# Patient Record
Sex: FEMALE | URBAN - METROPOLITAN AREA
[De-identification: names, ages, dates, MRNs, and addresses within clinical notes are randomized per-mention and may not be internally consistent; named-entity substitution may affect disease eponyms.]

---

## 2022-09-28 ENCOUNTER — TELEPHONE (OUTPATIENT)
Dept: OBGYN CLINIC | Facility: CLINIC | Age: 37
End: 2022-09-28

## 2024-06-18 ENCOUNTER — ORDER TRANSCRIPTION (OUTPATIENT)
Dept: PHYSICAL THERAPY | Facility: HOSPITAL | Age: 39
End: 2024-06-18

## 2024-06-18 DIAGNOSIS — R42 VERTIGO: Primary | ICD-10-CM

## 2024-06-18 DIAGNOSIS — H81.10 BENIGN PAROXYSMAL POSITIONAL VERTIGO: ICD-10-CM

## 2024-07-24 ENCOUNTER — TELEPHONE (OUTPATIENT)
Dept: PHYSICAL THERAPY | Facility: HOSPITAL | Age: 39
End: 2024-07-24

## 2024-07-25 ENCOUNTER — APPOINTMENT (OUTPATIENT)
Dept: PHYSICAL THERAPY | Facility: HOSPITAL | Age: 39
End: 2024-07-25
Attending: Other
Payer: COMMERCIAL

## 2024-07-25 ENCOUNTER — TELEPHONE (OUTPATIENT)
Dept: PHYSICAL THERAPY | Facility: HOSPITAL | Age: 39
End: 2024-07-25

## 2024-07-25 NOTE — PROGRESS NOTES
VESTIBULAR EVALUATION:     Diagnosis:   Vertigo (R42)  Benign paroxysmal positional vertigo       Referring Provider: Carolina Martinez  Date of Evaluation:    2024    Precautions:  {PT_PRECAUTIONS:564::\"None\"} Next MD visit:   none scheduled  Date of Surgery: n/a     PATIENT SUMMARY   Karoline Pickard is a 38 year old female who presents to therapy today with reports of ***  History/onset of current condition: ***    Symptoms with cough/sneeze or loud noise: {Yes/No:5584}  Falls: {Yes/No:5584}  Hx of migraines: {Yes/No:5584}  Hx of vision issue: {Yes/No:5584}  Hx of hearing issues: {EDW VESTIBULAR EVAL HEARING COMPLAINTS:8665}    Dizziness: Current ***/10, Best ***/10, Worst ***/10  Quality: ***  Frequency/Duration:  ***  Aggravates: {ELM Dizzy Aggravatin:::0}  Relieves: {ELM Dizzy Relievin:::0}    Headache: Current ***/10, Best ***/10, Worst ***/10  Quality:***  Frequency/Duration:  ***  Aggravates: {ELM Headache Aggravatin:::0}   Relieves: {ELM Headache Relievin:::0}     Cervical pain: Current ***/10, Best ***/10, Worst ***/10  Aggravates: {ELM Cervical Aggravatin:::0}   Relieves: {ELM Cervical Relievin:::0}     Dizziness Handicap Inventory (DHI): ***     Current functional limitations include ***.   Social history:  ***  Karoline describes prior level of function ***  Pt goals include ***  Past medical history was reviewed with Karoline. Significant findings include:  No past medical history on file.      ASSESSMENT  Karoline presents to physical therapy evaluation with primary c/o ***. The results of the objective tests and measures show ***.  Functional deficits include but are not limited to ***.  Signs and symptoms are consistent with diagnosis of ***. Pt and PT discussed evaluation findings, pathology, POC and HEP.  Pt voiced understanding and performs HEP correctly. Skilled Physical Therapy is medically necessary to address the above impairments and reach  functional goals.    OBJECTIVE:   Physical Exam:  Posture/Observation: ***   Neuro Screen: Sensation: ***     Coordination Testing:   Finger to Nose: {[facility] NEURO:3170:::0}   Pronation/supination: {[facility] NEURO:3170:::0}   Toe tapping: {[facility] NEURO:3170:::0}     Cervical spine ROM: Flex***, Ext ***, R SB ***, L SB *** , R ROT ***, L ROT ***   Adverse neuro signs with ROM: {yes no:908122:::1}     Occulomotor & Vestibulo-Ocular Exam:  Spontaneous Nystagmus: room light: *** ;  fixation blocked: ***  Smooth Pursuit: {NEG/NOT TEST:8413}  Saccades: {NEG/NOT TEST:8413}  Gaze Evoked Nystagmus:  room light: {NEG/NOT TEST:8413}; fixation blocked: {NEG/NOT TEST:8413}  Head Thrust: {NEG/NOT TEST:8413}  VOR screen:  ***    VOR Cancellation: {NEG/NOT TEST:8413}   Convergence: {NEG/NOT TEST:8413}  Cover/Uncover:  {NEG/NOT TEST:8413}  Cross Cover:  {NEG/NOT TEST:8413}  Head Shaking Nystagmus: {NEG/NOT TEST:8413}  Dynamic Visual Acuity:  {NEG/NOT TEST:8413}    Positional Testing:   [place]: R ***, L ***   Roll Test (HC): ***     Postural Control:   Romberg: EO *** sec, EC *** sec   Romberg on Foam: EO *** sec, EC *** sec   Tandem Stance: R back EO *** sec, EC *** sec; L back EO *** sec, EC *** sec   SLS: R EO *** sec, EC *** sec; L EO *** sec, EC *** sec     Functional Mobility:   Gait: pt ambulates on level ground with {pt/ot/slp gait:8355:::0}.   Functional Gait Assessment (FGA): ***   Timed Up and Go: ***   Five Times Sit to Stand Test: ***   Stairs:***    Today's Treatment and Response:   Pt education was provided on exam findings, treatment diagnosis, treatment plan, expectations, and prognosis. Pt was also provided recommendations for {pt/ot/slp education:8354:::0}.   Patient was instructed in and issued a HEP for: ***    Charges: PT Eval {LOW/MODERATE/HIGH COMPLEXITY:7155:::0}, ***      Total Timed Treatment: *** min     Total Treatment Time: *** min     Based on clinical rationale and outcome measures, this  evaluation involved {LOW/MODERATE/HIGH COMPLEXITY:7155:::0} decision making due to {1-2, 3+:7227:::0} personal factors/comorbidities, {3, 4+:7228:::0} body structures involved/activity limitations, and {Evolving/Unstable:7229:::0} symptoms including {Vital sign response/changing pain levels:7230:::0}.  PLAN OF CARE:    Goals: (to be met in *** visits)      Frequency / Duration: Patient will be seen for *** x/week or a total of *** visits over a 90 day period. Treatment will include: {[facility] Phys Ther Home Treatment:7126:::0}.     Education or treatment limitation: {TX_LIMIT:1927:::0}   Rehab Potential: {GOOD:115:::0}     Patient/Family/Caregiver was advised of these findings, precautions, and treatment options and has agreed to actively participate in planning and for this course of care.     Thank you for your referral. Please co-sign or sign and return this letter via fax as soon as possible to [phone]. If you have any questions, please contact me at Dept: [phone]    Sincerely,  Electronically signed by therapist: Angi Driscoll PT  Physician's certification required: Yes  I certify the need for these services furnished under this plan of treatment and while under my care.    X___________________________________________________ Date____________________    Certification From: 7/25/2024  To:10/23/2024

## 2024-07-25 NOTE — TELEPHONE ENCOUNTER
Called to f/u with patient after NC/NS to PT this date. LVM reminding of next appt and offering an opening for later same day if still available. Requested she call back to let therapist know if she plans on keeping her remaining PT appointments. Phone numbers for therapist and scheduling line given.

## 2024-08-01 ENCOUNTER — TELEPHONE (OUTPATIENT)
Dept: PHYSICAL THERAPY | Facility: HOSPITAL | Age: 39
End: 2024-08-01

## 2024-08-01 ENCOUNTER — APPOINTMENT (OUTPATIENT)
Dept: PHYSICAL THERAPY | Facility: HOSPITAL | Age: 39
End: 2024-08-01
Attending: Other
Payer: COMMERCIAL

## 2024-08-01 NOTE — TELEPHONE ENCOUNTER
Called to f/u with patient after 2nd NC/NS to PT evaluation. LVM letting patient know that since she NS to 2 appts, will cancel the remaining sessions unless calls therapist back by 3pm. If do not hear from her by that time, will cancel all remaining. Given therapist's VM to return call.

## 2024-08-09 ENCOUNTER — APPOINTMENT (OUTPATIENT)
Dept: PHYSICAL THERAPY | Facility: HOSPITAL | Age: 39
End: 2024-08-09
Attending: Other
Payer: COMMERCIAL

## 2024-08-16 ENCOUNTER — APPOINTMENT (OUTPATIENT)
Dept: PHYSICAL THERAPY | Facility: HOSPITAL | Age: 39
End: 2024-08-16
Attending: Other
Payer: COMMERCIAL

## 2024-08-23 ENCOUNTER — APPOINTMENT (OUTPATIENT)
Dept: PHYSICAL THERAPY | Facility: HOSPITAL | Age: 39
End: 2024-08-23
Attending: Other
Payer: COMMERCIAL

## 2024-08-30 ENCOUNTER — APPOINTMENT (OUTPATIENT)
Dept: PHYSICAL THERAPY | Facility: HOSPITAL | Age: 39
End: 2024-08-30
Attending: Other
Payer: COMMERCIAL

## 2024-09-06 ENCOUNTER — APPOINTMENT (OUTPATIENT)
Dept: PHYSICAL THERAPY | Facility: HOSPITAL | Age: 39
End: 2024-09-06
Attending: Other
Payer: COMMERCIAL

## 2024-09-13 ENCOUNTER — APPOINTMENT (OUTPATIENT)
Dept: PHYSICAL THERAPY | Facility: HOSPITAL | Age: 39
End: 2024-09-13
Attending: Other
Payer: COMMERCIAL

## 2024-09-20 ENCOUNTER — APPOINTMENT (OUTPATIENT)
Dept: PHYSICAL THERAPY | Facility: HOSPITAL | Age: 39
End: 2024-09-20
Attending: Other
Payer: COMMERCIAL

## 2024-09-27 ENCOUNTER — APPOINTMENT (OUTPATIENT)
Dept: PHYSICAL THERAPY | Facility: HOSPITAL | Age: 39
End: 2024-09-27
Attending: Other
Payer: COMMERCIAL